# Patient Record
Sex: FEMALE | Race: OTHER | URBAN - METROPOLITAN AREA
[De-identification: names, ages, dates, MRNs, and addresses within clinical notes are randomized per-mention and may not be internally consistent; named-entity substitution may affect disease eponyms.]

---

## 2022-08-30 ENCOUNTER — APPOINTMENT (OUTPATIENT)
Dept: CT IMAGING | Age: 27
End: 2022-08-30

## 2022-08-30 ENCOUNTER — HOSPITAL ENCOUNTER (EMERGENCY)
Age: 27
Discharge: HOME | End: 2022-08-30
Attending: EMERGENCY MEDICINE

## 2022-08-30 VITALS
TEMPERATURE: 98.8 F | HEART RATE: 83 BPM | OXYGEN SATURATION: 100 % | SYSTOLIC BLOOD PRESSURE: 109 MMHG | RESPIRATION RATE: 21 BRPM | DIASTOLIC BLOOD PRESSURE: 60 MMHG | WEIGHT: 130 LBS

## 2022-08-30 DIAGNOSIS — K52.9 COLITIS: Primary | ICD-10-CM

## 2022-08-30 LAB
A/G RATIO: 1.4 (ref 1.1–2.2)
ALBUMIN SERPL-MCNC: 4.6 G/DL (ref 3.4–5)
ALP BLD-CCNC: 79 U/L (ref 40–129)
ALT SERPL-CCNC: 45 U/L (ref 10–40)
ANION GAP SERPL CALCULATED.3IONS-SCNC: 11 MMOL/L (ref 3–16)
AST SERPL-CCNC: 70 U/L (ref 15–37)
BASOPHILS ABSOLUTE: 0 K/UL (ref 0–0.2)
BASOPHILS RELATIVE PERCENT: 0.3 %
BILIRUB SERPL-MCNC: 0.4 MG/DL (ref 0–1)
BUN BLDV-MCNC: 14 MG/DL (ref 7–20)
CALCIUM SERPL-MCNC: 9.6 MG/DL (ref 8.3–10.6)
CHLORIDE BLD-SCNC: 105 MMOL/L (ref 99–110)
CO2: 22 MMOL/L (ref 21–32)
CREAT SERPL-MCNC: 0.7 MG/DL (ref 0.6–1.1)
EOSINOPHILS ABSOLUTE: 0 K/UL (ref 0–0.6)
EOSINOPHILS RELATIVE PERCENT: 0.3 %
GFR AFRICAN AMERICAN: >60
GFR NON-AFRICAN AMERICAN: >60
GLUCOSE BLD-MCNC: 147 MG/DL (ref 70–99)
HCG QUALITATIVE: NEGATIVE
HCT VFR BLD CALC: 34.9 % (ref 36–48)
HEMOGLOBIN: 11.7 G/DL (ref 12–16)
LIPASE: 70 U/L (ref 13–60)
LYMPHOCYTES ABSOLUTE: 1.3 K/UL (ref 1–5.1)
LYMPHOCYTES RELATIVE PERCENT: 9.3 %
MCH RBC QN AUTO: 26.9 PG (ref 26–34)
MCHC RBC AUTO-ENTMCNC: 33.4 G/DL (ref 31–36)
MCV RBC AUTO: 80.5 FL (ref 80–100)
MONOCYTES ABSOLUTE: 0.7 K/UL (ref 0–1.3)
MONOCYTES RELATIVE PERCENT: 5.1 %
NEUTROPHILS ABSOLUTE: 11.8 K/UL (ref 1.7–7.7)
NEUTROPHILS RELATIVE PERCENT: 85 %
PDW BLD-RTO: 14.1 % (ref 12.4–15.4)
PLATELET # BLD: 371 K/UL (ref 135–450)
PMV BLD AUTO: 6.4 FL (ref 5–10.5)
POTASSIUM SERPL-SCNC: 3.6 MMOL/L (ref 3.5–5.1)
RBC # BLD: 4.34 M/UL (ref 4–5.2)
SODIUM BLD-SCNC: 138 MMOL/L (ref 136–145)
TOTAL PROTEIN: 7.9 G/DL (ref 6.4–8.2)
WBC # BLD: 13.9 K/UL (ref 4–11)

## 2022-08-30 PROCEDURE — 93005 ELECTROCARDIOGRAM TRACING: CPT | Performed by: EMERGENCY MEDICINE

## 2022-08-30 PROCEDURE — 2580000003 HC RX 258: Performed by: NURSE PRACTITIONER

## 2022-08-30 PROCEDURE — 80053 COMPREHEN METABOLIC PANEL: CPT

## 2022-08-30 PROCEDURE — 84703 CHORIONIC GONADOTROPIN ASSAY: CPT

## 2022-08-30 PROCEDURE — 85025 COMPLETE CBC W/AUTO DIFF WBC: CPT

## 2022-08-30 PROCEDURE — 83690 ASSAY OF LIPASE: CPT

## 2022-08-30 PROCEDURE — 36415 COLL VENOUS BLD VENIPUNCTURE: CPT

## 2022-08-30 PROCEDURE — 96375 TX/PRO/DX INJ NEW DRUG ADDON: CPT

## 2022-08-30 PROCEDURE — 96374 THER/PROPH/DIAG INJ IV PUSH: CPT

## 2022-08-30 PROCEDURE — 6360000002 HC RX W HCPCS: Performed by: NURSE PRACTITIONER

## 2022-08-30 PROCEDURE — 74176 CT ABD & PELVIS W/O CONTRAST: CPT

## 2022-08-30 PROCEDURE — 99284 EMERGENCY DEPT VISIT MOD MDM: CPT

## 2022-08-30 RX ORDER — DICYCLOMINE HYDROCHLORIDE 10 MG/1
10 CAPSULE ORAL EVERY 6 HOURS PRN
Qty: 20 CAPSULE | Refills: 0 | Status: SHIPPED | OUTPATIENT
Start: 2022-08-30 | End: 2022-09-04

## 2022-08-30 RX ORDER — SODIUM CHLORIDE 9 MG/ML
1000 INJECTION, SOLUTION INTRAVENOUS ONCE
Status: COMPLETED | OUTPATIENT
Start: 2022-08-30 | End: 2022-08-30

## 2022-08-30 RX ORDER — CIPROFLOXACIN 500 MG/1
500 TABLET, FILM COATED ORAL 2 TIMES DAILY
Qty: 20 TABLET | Refills: 0 | Status: SHIPPED | OUTPATIENT
Start: 2022-08-30 | End: 2022-09-09

## 2022-08-30 RX ORDER — ONDANSETRON 2 MG/ML
4 INJECTION INTRAMUSCULAR; INTRAVENOUS EVERY 30 MIN PRN
Status: DISCONTINUED | OUTPATIENT
Start: 2022-08-30 | End: 2022-08-31 | Stop reason: HOSPADM

## 2022-08-30 RX ORDER — KETOROLAC TROMETHAMINE 30 MG/ML
30 INJECTION, SOLUTION INTRAMUSCULAR; INTRAVENOUS ONCE
Status: COMPLETED | OUTPATIENT
Start: 2022-08-30 | End: 2022-08-30

## 2022-08-30 RX ADMIN — SODIUM CHLORIDE 1000 ML: 9 INJECTION, SOLUTION INTRAVENOUS at 19:53

## 2022-08-30 RX ADMIN — KETOROLAC TROMETHAMINE 30 MG: 30 INJECTION, SOLUTION INTRAMUSCULAR at 19:54

## 2022-08-30 RX ADMIN — ONDANSETRON 4 MG: 2 INJECTION INTRAMUSCULAR; INTRAVENOUS at 19:54

## 2022-08-30 ASSESSMENT — ENCOUNTER SYMPTOMS
VOMITING: 0
DIARRHEA: 0
ABDOMINAL PAIN: 1
SHORTNESS OF BREATH: 0
CHEST TIGHTNESS: 0
NAUSEA: 1
BACK PAIN: 1

## 2022-08-30 ASSESSMENT — PAIN DESCRIPTION - ORIENTATION: ORIENTATION: ANTERIOR;POSTERIOR;LOWER;MID

## 2022-08-30 ASSESSMENT — PAIN DESCRIPTION - PAIN TYPE: TYPE: ACUTE PAIN

## 2022-08-30 ASSESSMENT — LIFESTYLE VARIABLES
HOW MANY STANDARD DRINKS CONTAINING ALCOHOL DO YOU HAVE ON A TYPICAL DAY: PATIENT DOES NOT DRINK
HOW OFTEN DO YOU HAVE A DRINK CONTAINING ALCOHOL: NEVER

## 2022-08-30 ASSESSMENT — PAIN - FUNCTIONAL ASSESSMENT: PAIN_FUNCTIONAL_ASSESSMENT: 0-10

## 2022-08-30 ASSESSMENT — PAIN DESCRIPTION - LOCATION: LOCATION: ABDOMEN;BACK

## 2022-08-30 ASSESSMENT — PAIN SCALES - GENERAL
PAINLEVEL_OUTOF10: 10
PAINLEVEL_OUTOF10: 10

## 2022-08-30 ASSESSMENT — PAIN DESCRIPTION - FREQUENCY: FREQUENCY: CONTINUOUS

## 2022-08-30 NOTE — ED PROVIDER NOTES
[facility]        Pt Name: Hari Bray  MRN: [id number]  [place] 1995  Date of evaluation: 2022  Provider: CHRIS Adair CNP  PCP: No primary care provider on file. Note Started: 7:19 PM EDT        I have seen and evaluated this patient with my supervising physician [address] [place] [address]       Chief Complaint   Patient presents with    Abdominal Pain     Pt states that she has abdominal pain and back pain together  and states that her pain started yesterday morning and started in the back first then radiated later to her abdomen. Pt states that she bought an unknown tablet at the drug store and took it for the pain and it got worse. Pt states that her pain is a 10 and very intense. This has never occurred before. Pt brought in by [facility] coming from home. Pt on period now but almost complete. pt has 1 child full term  birth       HISTORY OF PRESENT ILLNESS   (Location, Timing/Onset, Context/Setting, Quality, Duration, Modifying Factors, Severity, Associated Signs and Symptoms)  Note limiting factors. Chief Complaint: Right-sided back and abdominal pain. Pain is severe. History of . No concern for pregnancy. Hari Bray is a 28 y.o. female who presents to the ER for the above stated reason    Nursing Notes were all reviewed and agreed with or any disagreements were addressed in the HPI. REVIEW OF SYSTEMS    (2-9 systems for level 4, 10 or more for level 5)     Review of Systems   Constitutional:  Negative for activity change, chills and fever. Respiratory:  Negative for chest tightness and shortness of breath. Cardiovascular:  Negative for chest pain. Gastrointestinal:  Positive for abdominal pain and nausea. Negative for diarrhea and vomiting. Genitourinary:  Positive for flank pain. Negative for dysuria. Musculoskeletal:  Positive for back pain. All other systems reviewed and are negative. Positives and Pertinent negatives as per HPI. Except as noted above in the ROS, all other systems were reviewed and negative. PAST MEDICAL HISTORY   No past medical history on file. SURGICAL HISTORY   No past surgical history on file. [address]       Discharge Medication List as of 8/30/2022 10:18 PM            ALLERGIES     Patient has no known allergies. FAMILYHISTORY     No family history on file. SOCIAL HISTORY          SCREENINGS    Cande Coma Scale  Eye Opening: Spontaneous  Best Verbal Response: Oriented  Best Motor Response: Obeys commands  Cande Coma Scale Score: 15        PHYSICAL EXAM    (up to 7 for level 4, 8 or more for level 5)     ED Triage Vitals [08/30/22 1837]   BP Temp Temp src Heart Rate Resp SpO2 Height Weight   125/84 -- -- 86 17 100 % -- 130 lb (59 kg)       Physical Exam  Vitals and nursing note reviewed. Constitutional:       Appearance: She is well-developed. She is not diaphoretic. HENT:      Head: Normocephalic and atraumatic. Right Ear: External ear normal.      Left Ear: External ear normal.   Eyes:      General:         Right eye: No discharge. Left eye: No discharge. Neck:      Vascular: No JVD. Cardiovascular:      Rate and Rhythm: Normal rate and regular rhythm. Pulses: Normal pulses. Heart sounds: Normal heart sounds. Pulmonary:      Effort: Pulmonary effort is normal. No respiratory distress. Breath sounds: Normal breath sounds. Abdominal:      Palpations: Abdomen is soft. Tenderness: There is abdominal tenderness in the right upper quadrant and epigastric area. There is right CVA tenderness and guarding. Musculoskeletal:         General: Normal range of motion. Skin:     General: Skin is warm and dry. Coloration: Skin is not pale. Neurological:      Mental Status: She is alert.    Psychiatric:         Behavior: Behavior normal. DIAGNOSTIC RESULTS   LABS:    Labs Reviewed   CBC WITH AUTO DIFFERENTIAL - Abnormal; Notable for the following components:       Result Value    WBC 13.9 (*)     Hemoglobin 11.7 (*)     Hematocrit 34.9 (*)     Neutrophils Absolute 11.8 (*)     All other components within normal limits   COMPREHENSIVE METABOLIC PANEL - Abnormal; Notable for the following components:    Glucose 147 (*)     ALT 45 (*)     AST 70 (*)     All other components within normal limits   LIPASE - Abnormal; Notable for the following components:    Lipase 70.0 (*)     All other components within normal limits   HCG, SERUM, QUALITATIVE       When ordered only abnormal lab results are displayed. All other labs were within normal range or not returned as of this dictation. EKG: When ordered, EKG's are interpreted by the Emergency Department Physician in the absence of a cardiologist.  Please see their note for interpretation of EKG. RADIOLOGY:   Non-plain film images such as CT, Ultrasound and MRI are read by the radiologist. Plain radiographic images are visualized and preliminarily interpreted by the ED Provider with the below findings:        Interpretation per the Radiologist below, if available at the time of this note:    CT ABDOMEN PELVIS WO CONTRAST Additional Contrast? None   Final Result   Possible mild mural thickening of the large bowel, with equivocal pericolonic   fat stranding. This could be artifactual secondary to under distension. Nonetheless, please correlate with clinical evidence of colitis. Cholelithiasis. Small umbilical hernia which contains fat only. No results found.         PROCEDURES   Unless otherwise noted below, none     Procedures    CRITICAL CARE TIME       CONSULTS:  None      EMERGENCY DEPARTMENT COURSE and DIFFERENTIAL DIAGNOSIS/MDM:   Vitals:    Vitals:    08/30/22 2138 08/30/22 2143 08/30/22 2148 08/30/22 2153   BP:  109/60     Pulse: 86 87 80 83   Resp: 18 17 21 21   Temp: TempSrc:       SpO2: 100% 100% 100% 100%   Weight:           Patient was given the following medications:  Medications   ketorolac (TORADOL) injection 30 mg (30 mg IntraVENous Given 8/30/22 1954)   0.9 % sodium chloride infusion (0 mLs IntraVENous Stopped 8/30/22 2218)         Is this patient to be included in the SEP-1 Core Measure due to severe sepsis or septic shock? No   Exclusion criteria - the patient is NOT to be included for SEP-1 Core Measure due to:  2+ SIRS criteria are not met    Briefly, this is a 28year old female that presents to the emergency department with complaint of right-sided back and abdominal pain. CBC shows a leukocytosis white count of 13.9. CMP shows a slight transaminitis. Lipase is elevated at 70. She is not pregnant. CT ABDOMEN PELVIS WO CONTRAST Additional Contrast? None (Final result)  Result time 08/30/22 21:02:20  Final result by Rimma Bazan MD (08/30/22 21:02:20)                Impression:    Possible mild mural thickening of the large bowel, with equivocal pericolonic   fat stranding. This could be artifactual secondary to under distension. Nonetheless, please correlate with clinical evidence of colitis. Cholelithiasis. Small umbilical hernia which contains fat only. UA was ordered but not collected. The patient will be treated outpatient with Cipro and Bentyl for colitis. Close a patient follow-up and strict return precautions discussed. I see nothing that would suggest an acute abdomen at this time. Based on history, physical exam, risk factors, and tests my suspicion for bowel obstruction, incarcerated hernia, acute pancreatitis, intra-abdominal abscess, perforated viscus, diverticulitis, cholecystitis, appendicitis, PID, ovarian torsion, ectopic pregnancy and tubo-ovarian abscess is very low. There is no evidence of peritonitis, sepsis or toxicity at this time.  I feel the patient can be managed as an outpatient with follow-up with her family doctor in 24-48 hours. Instructions have been given for the patient to return to the ED for worsening of the pain, high fevers, intractable vomiting, or bleeding. FINAL IMPRESSION      1.  Colitis          DISPOSITION/PLAN   DISPOSITION Decision To Discharge 08/30/2022 10:12:06 PM      PATIENT REFERRED TO:      Schedule an appointment as soon as possible for a visit in 3 days  Follow up within 3 days, Return to ED sooner if symptoms worsen    DISCHARGE MEDICATIONS:  Discharge Medication List as of 8/30/2022 10:18 PM        START taking these medications    Details   dicyclomine (BENTYL) 10 MG capsule Take 1 capsule by mouth every 6 hours as needed (Bowel spasms), Disp-20 capsule, R-0Print      ciprofloxacin (CIPRO) 500 MG tablet Take 1 tablet by mouth 2 times daily for 10 days, Disp-20 tablet, R-0Print             DISCONTINUED MEDICATIONS:  Discharge Medication List as of 8/30/2022 10:18 PM                 (Please note that portions of this note were completed with a voice recognition program.  Efforts were made to edit the dictations but occasionally words are mis-transcribed.)    CHRIS Stanton CNP (electronically signed)           CHRIS Stanton CNP  08/31/22 0128

## 2022-08-30 NOTE — ED NOTES
Pt is not constipated - pt last bowel movement today - no blood noted in bowels or urination     Jose Khan RN  08/30/22 7826

## 2022-08-31 LAB
EKG ATRIAL RATE: 84 BPM
EKG DIAGNOSIS: NORMAL
EKG P AXIS: 71 DEGREES
EKG P-R INTERVAL: 136 MS
EKG Q-T INTERVAL: 382 MS
EKG QRS DURATION: 74 MS
EKG QTC CALCULATION (BAZETT): 451 MS
EKG R AXIS: 60 DEGREES
EKG T AXIS: 52 DEGREES
EKG VENTRICULAR RATE: 84 BPM

## 2022-08-31 NOTE — ED PROVIDER NOTES
In addition to the advanced practice provider, I personally saw Rony Griffith and performed a substantive portion of the visit including all aspects of the medical decision making. Medical Decision Making  27yoF here today with a c/o diffuse abdominal pain now resolved. No fever, chills sweats  On exam, WDWNF, NAD, Heart RRR, Lungs CTAB, abdomen soft, NT, ND    Labs and CT reviewed; ? Colitis. Cipro trial, f/u PCP  DC home. [unfilled]    CT: IMPRESSION:  Possible mild mural thickening of the large bowel, with equivocal pericolonic  fat stranding. This could be artifactual secondary to under distension. Nonetheless, please correlate with clinical evidence of colitis. Cholelithiasis. Small umbilical hernia which contains fat only. SEP-1  Is this patient to be included in the SEP-1 Core Measure due to severe sepsis or septic shock? No   Exclusion criteria - the patient is NOT to be included for SEP-1 Core Measure due to: Infection is not suspected    Screenings     Cande Coma Scale  Eye Opening: Spontaneous  Best Verbal Response: Oriented  Best Motor Response: Obeys commands  Cande Coma Scale Score: 15          Patient Referrals:      Schedule an appointment as soon as possible for a visit in 3 days  Follow up within 3 days, Return to ED sooner if symptoms worsen    Discharge Medications:  New Prescriptions    CIPROFLOXACIN (CIPRO) 500 MG TABLET    Take 1 tablet by mouth 2 times daily for 10 days    DICYCLOMINE (BENTYL) 10 MG CAPSULE    Take 1 capsule by mouth every 6 hours as needed (Bowel spasms)       FINAL IMPRESSION  1. Colitis        Blood pressure 109/60, pulse 83, temperature 98.8 °F (37.1 °C), temperature source Oral, resp. rate 21, weight 130 lb (59 kg), SpO2 100 %. For further details of Donovan Marin's emergency department encounter, please see documentation by advanced practice provider, Silvia Gracia.        Lefty Torres MD  08/31/22 6413